# Patient Record
(demographics unavailable — no encounter records)

---

## 2025-02-26 NOTE — HISTORY OF PRESENT ILLNESS
[de-identified] : Patient presents for RT hip pain evaluation. Patient states that she has been having pain for years on the lateral hip. Patient states that in the last 3 week the pain has been worse. Patient states that she has pain putting weight on her RT leg as well as sleeping on that side of her hip. Patient has been taking Advil and Naprosyn as well as trying icing and heat. Patient has been working on HEP.

## 2025-02-26 NOTE — DISCUSSION/SUMMARY
[de-identified] : I reviewed patient's radiographs and discussed her condition and treatment options.  Defer further imaging or injection.  Advised activity modification and topical ointment for pain.  Follow up as needed.  Patient voiced understanding and agreement with the plan.

## 2025-02-26 NOTE — PHYSICAL EXAM
[NL (120)] : flexion 120 degrees [5___] : extension 5[unfilled]/5 [2+] : posterior tibialis pulse: 2+ [] : patient ambulates without assistive device [Right] : right hip with pelvis [AP] : anteroposterior [Lateral] : lateral [There are no fractures, subluxations or dislocations. No significant abnormalities are seen] : There are no fractures, subluxations or dislocations. No significant abnormalities are seen

## 2025-03-28 NOTE — DISCUSSION/SUMMARY
[de-identified] : I reviewed patient's prior radiographs and discussed her condition and treatment options.  She tolerated injection well.  Follow up as needed.  Patient voiced understanding and agreement with the plan.

## 2025-03-28 NOTE — PROCEDURE
[Large Joint Injection] : Large joint injection [Right] : of the right [Pain] : pain [Inflammation] : inflammation [Alcohol] : alcohol [Betadine] : betadine [Ethyl Chloride sprayed topically] : ethyl chloride sprayed topically [Sterile technique used] : sterile technique used [___ cc    6mg] :  Betamethasone (Celestone) ~Vcc of 6mg [___ cc    1%] : Lidocaine ~Vcc of 1%  [] : Patient tolerated procedure well [Call if redness, pain or fever occur] : call if redness, pain or fever occur [Apply ice for 15min out of every hour for the next 12-24 hours as tolerated] : apply ice for 15 minutes out of every hour for the next 12-24 hours as tolerated [Patient was advised to rest the joint(s) for ____ days] : patient was advised to rest the joint(s) for [unfilled] days [Previous OTC use and PT nontherapeutic] : patient has tried OTC's including aspirin, Ibuprofen, Aleve, etc or prescription NSAIDS, and/or exercises at home and/or physical therapy without satisfactory response [Patient had decreased mobility in the joint] : patient had decreased mobility in the joint [Risks, benefits, alternatives discussed / Verbal consent obtained] : the risks benefits, and alternatives have been discussed, and verbal consent was obtained [Greater Trochanteric Bursa] : greater trochanteric bursa

## 2025-03-28 NOTE — HISTORY OF PRESENT ILLNESS
[de-identified] : Patient is following up on RT hip pain. Patient states that she cannot sleep at night because of the pain. Patient is taking Advil consistently as well as icing and topical ointments.

## 2025-03-28 NOTE — PHYSICAL EXAM
[NL (120)] : flexion 120 degrees [5___] : extension 5[unfilled]/5 [2+] : posterior tibialis pulse: 2+ [Right] : right hip with pelvis [AP] : anteroposterior [Lateral] : lateral [There are no fractures, subluxations or dislocations. No significant abnormalities are seen] : There are no fractures, subluxations or dislocations. No significant abnormalities are seen [] : no erythema